# Patient Record
Sex: MALE | Race: WHITE | NOT HISPANIC OR LATINO | Employment: OTHER | URBAN - METROPOLITAN AREA
[De-identification: names, ages, dates, MRNs, and addresses within clinical notes are randomized per-mention and may not be internally consistent; named-entity substitution may affect disease eponyms.]

---

## 2023-07-22 ENCOUNTER — HOSPITAL ENCOUNTER (EMERGENCY)
Facility: HOSPITAL | Age: 73
Discharge: HOME | End: 2023-07-22
Attending: EMERGENCY MEDICINE
Payer: MEDICARE

## 2023-07-22 VITALS
HEART RATE: 92 BPM | DIASTOLIC BLOOD PRESSURE: 89 MMHG | SYSTOLIC BLOOD PRESSURE: 120 MMHG | OXYGEN SATURATION: 97 % | RESPIRATION RATE: 18 BRPM | TEMPERATURE: 97.5 F

## 2023-07-22 DIAGNOSIS — K06.8 BLEEDING GUMS: Primary | ICD-10-CM

## 2023-07-22 PROCEDURE — 99284 EMERGENCY DEPT VISIT MOD MDM: CPT | Performed by: EMERGENCY MEDICINE

## 2023-07-22 PROCEDURE — 99282 EMERGENCY DEPT VISIT SF MDM: CPT

## 2023-07-22 RX ORDER — LIDOCAINE HYDROCHLORIDE AND EPINEPHRINE 10; 10 MG/ML; UG/ML
1 INJECTION, SOLUTION INFILTRATION; PERINEURAL ONCE
Status: COMPLETED | OUTPATIENT
Start: 2023-07-22 | End: 2023-07-22

## 2023-07-22 RX ORDER — TRANEXAMIC ACID 100 MG/ML
500 INJECTION, SOLUTION INTRAVENOUS ONCE
Status: COMPLETED | OUTPATIENT
Start: 2023-07-22 | End: 2023-07-22

## 2023-07-22 RX ADMIN — LIDOCAINE HYDROCHLORIDE,EPINEPHRINE BITARTRATE 1 ML: 10; .01 INJECTION, SOLUTION INFILTRATION; PERINEURAL at 02:48

## 2023-07-22 RX ADMIN — TRANEXAMIC ACID 500 MG: 100 INJECTION INTRAVENOUS at 02:48

## 2023-07-22 NOTE — ED PROVIDER NOTES
History  Chief Complaint   Patient presents with   • Dental Problem     Pt had several teeth extracted at 11am yesterday and states around 9pm he started having bleeding again. Pt is on xarelto. Patient is a 59-year-old male past medical history significant for A-fib on Xarelto presenting to the emergency department for evaluation of bleeding right upper gums. Patient did have his teeth pulled upper right side with [facility] it has been oozing since yesterday. Patient was told to continuously change packing. Patient states it comes and goes and comes with the bleeding. He states its not a lot of blood but it continues to ooze. Patient states that he talked to the on-call [facility] doctor who told him to come to the hospital and he would meet him there. Patient denies any other symptoms he denies any headaches dizziness tinnitus vision change neck pain back pain numbness or tingling in any of his extremities he denies any nausea vomiting diarrhea or constipation denies any dysuria hematuria. He denies any lightheadedness. None       History reviewed. No pertinent past medical history. History reviewed. No pertinent surgical history. History reviewed. No pertinent family history. I have reviewed and agree with the history as documented. E-Cigarette/Vaping     E-Cigarette/Vaping Substances     Social History     Tobacco Use   • Smoking status: Never   • Smokeless tobacco: Never   Substance Use Topics   • Alcohol use: Yes   • Drug use: Never        Review of Systems   Constitutional: Negative for chills and fever. HENT: Positive for dental problem. Negative for ear pain, postnasal drip, rhinorrhea, sinus pain and sore throat. Eyes: Negative for pain and visual disturbance. Respiratory: Negative for cough, chest tightness and shortness of breath. Cardiovascular: Negative for chest pain and palpitations.    Gastrointestinal: Negative for abdominal pain, constipation, diarrhea, nausea and vomiting. Genitourinary: Negative for dysuria and hematuria. Musculoskeletal: Negative for arthralgias and back pain. Skin: Negative for color change and rash. Neurological: Negative for dizziness, seizures, syncope, weakness, light-headedness, numbness and headaches. Psychiatric/Behavioral: Negative for agitation and behavioral problems. All other systems reviewed and are negative. Physical Exam  ED Triage Vitals [07/22/23 0127]   Temperature Pulse Respirations Blood Pressure SpO2   97.5 °F (36.4 °C) 92 18 120/89 97 %      Temp Source Heart Rate Source Patient Position - Orthostatic VS BP Location FiO2 (%)   Temporal Monitor Sitting Left arm --      Pain Score       --             Orthostatic Vital Signs  Vitals:    07/22/23 0127   BP: 120/89   Pulse: 92   Patient Position - Orthostatic VS: Sitting       Physical Exam  Vitals and nursing note reviewed. Constitutional:       General: He is not in acute distress. Appearance: Normal appearance. He is well-developed. HENT:      Head: Normocephalic and atraumatic. Right Ear: External ear normal.      Left Ear: External ear normal.      Nose: Nose normal.      Mouth/Throat:      Comments: No active bleeding  Formed clot in the RIGHT upper teeth. Eyes:      Extraocular Movements: Extraocular movements intact. Conjunctiva/sclera: Conjunctivae normal.   Cardiovascular:      Rate and Rhythm: Normal rate and regular rhythm. Heart sounds: Normal heart sounds. No murmur heard. Pulmonary:      Effort: Pulmonary effort is normal. No respiratory distress. Breath sounds: Normal breath sounds. Abdominal:      General: Abdomen is flat. Palpations: Abdomen is soft. Tenderness: There is no abdominal tenderness. Musculoskeletal:         General: No swelling. Normal range of motion. Cervical back: Normal range of motion and neck supple. Skin:     General: Skin is warm and dry.       Capillary Refill: Capillary refill takes less than 2 seconds. Neurological:      General: No focal deficit present. Mental Status: He is alert and oriented to person, place, and time. Psychiatric:         Mood and Affect: Mood normal.         Behavior: Behavior normal.         ED Medications  Medications   lidocaine-epinephrine (XYLOCAINE/EPINEPHRINE) 1 %-1:100,000 injection 1 mL (1 mL Infiltration Given by Other 7/22/23 0248)   tranexamic acid 100mg/mL (for epistaxis) 500 mg (500 mg Nasal Given by Other 7/22/23 0248)       Diagnostic Studies  Results Reviewed     None                 No orders to display         Procedures  Procedures      ED Course  ED Course as of 07/22/23 0411   Sat Jul 22, 2023   0235 [facility] contacted about patient. Patient states that [facility] at [address] Dr[address] Maria Elena Platt instructed him to come in.    0303 [place] epi and txa soaked julio in place. 5957 Stopped bleeding               Identification of Seniors at [facility] Most Recent Value   (ISAR) Identification of Seniors at Risk    Before the illness or injury that brought you to the Emergency, did you need someone to help you on a regular basis? 0 Filed at: 07/22/2023 0129   In the last 24 hours, have you needed more help than usual? 0 Filed at: 07/22/2023 0093   Have you been hospitalized for one or more nights during the past 6 months? 0 Filed at: 07/22/2023 0129   In general, do you see well? 0 Filed at: 07/22/2023 0129   In general, do you have serious problems with your memory? 0 Filed at: 07/22/2023 0129   Do you take more than three different medications every day? 1 Filed at: 07/22/2023 0129   ISAR Score 1 Filed at: 07/22/2023 0129                              Medical Decision Making  46-year-old male presenting to the emergency department for bleeding gums. Patient with teeth pulled yesterday. Patient is on Xarelto for A-fib. Patient held his Xarelto today. Patient with continued oozing from the site.   Less than 24 hours ago his teeth was pulled with [facility].  I tried to contact [facility] on-call with no response. I had the patient chew on teabags in order to stop the bleed. After chewing on the teabags the area started to ooze blood. Applied TXA and lidocaine with epinephrine onto a gauze pad and had him chew on it and allow it to go to the area. The bleeding eventually stopped. He was advised to follow-up with [facility] ASAP for further evaluation and to call the office tomorrow morning. He is advised to come back to the hospital with any new worsening or concerning symptoms. Patient was aware he had no questions or concerns he was feeling better at the time of discharge. Provided patient with more gauze. Advised him to continue changing the area. Advised to come back with worsening bleeding. Patient and his son are aware stable for discharge. Risk  Prescription drug management. Disposition  Final diagnoses:   Bleeding gums     Time reflects when diagnosis was documented in both MDM as applicable and the Disposition within this note     Time User Action Codes Description Comment    7/22/2023  2:24 AM Maryjane Argueta [K06.8] Bleeding gums       ED Disposition     ED Disposition   Discharge    Condition   Stable    Date/Time   Sat Jul 22, 2023  2:23 AM    Comment   Shaji Fickle discharge to home/self care.                Follow-up Information     Follow up With Specialties Details Why Contact Info Additional Information    Wayne Blackburn MD [facility] Medicine Schedule an appointment as soon as possible for a visit  for follow up [address] [facility] [address] [address] Emergency Department Emergency Medicine Go to  As needed, If symptoms worsen [address] [facility] Emergency Department, [address] [place] [address], [address] [facility] and [address]Johanny Christiansen [address]  [address]  Schedule an appointment as soon as possible for a visit  for follow up [address]  [address] [place]   [phone]           There are no discharge medications for this patient. No discharge procedures on file. PDMP Review     None           ED Provider  Attending physically available and evaluated Zeke Hilarios. I managed the patient along with the ED Attending.     Electronically Signed by         Jessica Bang DO  07/22/23 9764

## 2023-07-22 NOTE — ED ATTENDING ATTESTATION
Final Diagnoses:     1. Bleeding gums           Shelley BAILON MD, saw and evaluated the patient. All available labs and X-rays were ordered by me or the resident / non-physician and have been reviewed by myself. I discussed the patient with the resident / non-physician and agree with the resident's / non-physician practitioner's findings and plan as documented in the resident's / non-physician practicitioner's note, except where noted. At this point, I agree with the current assessment done in the ED. I was present during key portions of all procedures performed unless otherwise stated. Nursing Triage:     Chief Complaint   Patient presents with   • Dental Problem     Pt had several teeth extracted at 11am yesterday and states around 9pm he started having bleeding again. Pt is on xarelto. HPI:   This is a 72 y.o. male presenting for evaluation of multiple dental extractions. The patient had it occur today at 11 AM.  Around 9 PM the bleeding started  He is on Xarelto. Called [facility] who sent him in for evaluation. Seen immediately on arrival.    ASSESSMENT + PLAN:   Speak to [facility]  Likely DC  Hold Eliquis x1 day (on it for AF, not stroke or valve). Physical:   Pertinent: Examined with resident  No active bleeding  Formed clot in the RIGHT upper teeth. We placed tea bags for him to chew on. General: VS reviewed  Appears in NAD  awake, alert. Well-nourished, well-developed. Appears stated age. Speaking normally in full sentences. Head: Normocephalic, atraumatic  Eyes: EOM-I. No diplopia. No hyphema. No subconjunctival hemorrhages. Symmetrical lids. ENT: Atraumatic external nose and ears. MMM  No malocclusion. No stridor. Normal phonation. No drooling. Normal swallowing. Neck: No JVD. CV: No pallor noted  Lungs:   No tachypnea  No respiratory distress  MSK:   FROM spontaneously  Skin: Dry, intact. Neuro: Awake, alert, GCS15, CN II-XII grossly intact.    Motor grossly intact. Psychiatric/Behavioral: interacting normally; appropriate mood/affect.  Exam: deferred    Vitals:    07/22/23 0127   BP: 120/89   BP Location: Left arm   Pulse: 92   Resp: 18   Temp: 97.5 °F (36.4 °C)   TempSrc: Temporal   SpO2: 97%     - There are no obvious limitations to social determinants of care. - Nursing note reviewed. - Vitals reviewed. - Orders placed by myself and/or advanced practitioner / resident.    - Previous chart was reviewed  - No language barrier.   - History obtained from patient. - There are no limitations to the history obtained:     Past Medical:    has no past medical history on file. Past Surgical:    has no past surgical history on file. Social:     Social History     Substance and Sexual Activity   Alcohol Use Yes     Social History     Tobacco Use   Smoking Status Never   Smokeless Tobacco Never     Social History     Substance and Sexual Activity   Drug Use Never       Echo:   No results found for this or any previous visit. No results found for this or any previous visit. Cath:    No results found for this or any previous visit. Code Status: No Order  Advance Directive and Living Will:      Power of :    POLST:    Medications   lidocaine-epinephrine (XYLOCAINE/EPINEPHRINE) 1 %-1:100,000 injection 1 mL (1 mL Infiltration Given by Other 7/22/23 0248)   tranexamic acid 100mg/mL (for epistaxis) 500 mg (500 mg Nasal Given by Other 7/22/23 0248)     No orders to display     No orders of the defined types were placed in this encounter.     Labs Reviewed - No data to display  Time reflects when diagnosis was documented in both MDM as applicable and the Disposition within this note     Time User Action Codes Description Comment    7/22/2023  2:24 AM Ana Fontana Add [K06.8] Bleeding gums       ED Disposition     ED Disposition   Discharge    Condition   Stable    Date/Time   Sat Jul 22, 2023  2:23 AM    Comment   Jackie Hernandez discharge to home/self care. Follow-up Information     Follow up With Specialties Details Why Contact Info Additional Information    Anup Mitchell MD [facility] Medicine Schedule an appointment as soon as possible for a visit  for follow up [address] [address] [address] Emergency Department Emergency Medicine Go to  As needed, If symptoms worsen [address] [id number]  [facility] Emergency Department, [address] [place], [place], [address] [facility] [address]  Schedule an appointment as soon as possible for a visit  for follow up [address] [place]   [phone]         There are no discharge medications for this patient. No discharge procedures on file. None                        Portions of the record may have been created with voice recognition software. Occasional wrong word or "sound a like" substitutions may have occurred due to the inherent limitations of voice recognition software. Read the chart carefully and recognize, using context, where substitutions have occurred.     Electronically signed by:  Elida Mei